# Patient Record
Sex: MALE | Race: WHITE | NOT HISPANIC OR LATINO | Employment: FULL TIME | URBAN - NONMETROPOLITAN AREA
[De-identification: names, ages, dates, MRNs, and addresses within clinical notes are randomized per-mention and may not be internally consistent; named-entity substitution may affect disease eponyms.]

---

## 2024-08-07 ENCOUNTER — OFFICE VISIT (OUTPATIENT)
Dept: CARDIOLOGY | Facility: CLINIC | Age: 59
End: 2024-08-07
Payer: COMMERCIAL

## 2024-08-07 VITALS
HEIGHT: 69 IN | SYSTOLIC BLOOD PRESSURE: 121 MMHG | DIASTOLIC BLOOD PRESSURE: 77 MMHG | WEIGHT: 226.6 LBS | BODY MASS INDEX: 33.56 KG/M2 | OXYGEN SATURATION: 98 % | HEART RATE: 59 BPM

## 2024-08-07 DIAGNOSIS — R94.31 ABNORMAL EKG: ICD-10-CM

## 2024-08-07 DIAGNOSIS — R06.09 DOE (DYSPNEA ON EXERTION): ICD-10-CM

## 2024-08-07 DIAGNOSIS — E78.5 DYSLIPIDEMIA: ICD-10-CM

## 2024-08-07 DIAGNOSIS — R53.83 OTHER FATIGUE: ICD-10-CM

## 2024-08-07 DIAGNOSIS — R07.2 PRECORDIAL CHEST PAIN: Primary | ICD-10-CM

## 2024-08-07 PROCEDURE — 99204 OFFICE O/P NEW MOD 45 MIN: CPT | Performed by: CLINICAL NURSE SPECIALIST

## 2024-10-18 ENCOUNTER — HOSPITAL ENCOUNTER (OUTPATIENT)
Dept: CARDIOLOGY | Facility: HOSPITAL | Age: 59
Discharge: HOME | End: 2024-10-18
Payer: COMMERCIAL

## 2024-10-18 DIAGNOSIS — R06.09 DOE (DYSPNEA ON EXERTION): ICD-10-CM

## 2024-10-18 DIAGNOSIS — R07.2 PRECORDIAL CHEST PAIN: ICD-10-CM

## 2024-10-18 DIAGNOSIS — R53.83 OTHER FATIGUE: ICD-10-CM

## 2024-10-18 PROCEDURE — 93017 CV STRESS TEST TRACING ONLY: CPT

## 2024-10-18 PROCEDURE — 93306 TTE W/DOPPLER COMPLETE: CPT

## 2024-10-19 LAB
BH CV STRESS RECOVERY BP: NORMAL MMHG
BH CV STRESS RECOVERY HR: 86 BPM
MAXIMAL PREDICTED HEART RATE: 163 BPM
PERCENT MAX PREDICTED HR: 104.91 %
STRESS BASELINE BP: NORMAL MMHG
STRESS BASELINE HR: 63 BPM
STRESS PERCENT HR: 123 %
STRESS POST ESTIMATED WORKLOAD: 11.8 METS
STRESS POST EXERCISE DUR MIN: 9 MIN
STRESS POST EXERCISE DUR SEC: 31 SEC
STRESS POST PEAK BP: NORMAL MMHG
STRESS POST PEAK HR: 171 BPM
STRESS TARGET HR: 139 BPM

## 2024-10-20 LAB
BH CV ECHO MEAS - ACS: 2.7 CM
BH CV ECHO MEAS - AO MAX PG: 5.2 MMHG
BH CV ECHO MEAS - AO MEAN PG: 2.9 MMHG
BH CV ECHO MEAS - AO ROOT DIAM: 3.6 CM
BH CV ECHO MEAS - AO V2 MAX: 114 CM/SEC
BH CV ECHO MEAS - AO V2 VTI: 25.7 CM
BH CV ECHO MEAS - EDV(CUBED): 107.2 ML
BH CV ECHO MEAS - EDV(MOD-SP4): 151 ML
BH CV ECHO MEAS - EF(MOD-SP4): 57.2 %
BH CV ECHO MEAS - ESV(CUBED): 17.8 ML
BH CV ECHO MEAS - ESV(MOD-SP4): 64.7 ML
BH CV ECHO MEAS - FS: 45.1 %
BH CV ECHO MEAS - IVS/LVPW: 0.92 CM
BH CV ECHO MEAS - IVSD: 1.22 CM
BH CV ECHO MEAS - LA DIMENSION: 3.6 CM
BH CV ECHO MEAS - LAT PEAK E' VEL: 11.1 CM/SEC
BH CV ECHO MEAS - LV DIASTOLIC VOL/BSA (35-75): 69.4 CM2
BH CV ECHO MEAS - LV MASS(C)D: 235.1 GRAMS
BH CV ECHO MEAS - LV SYSTOLIC VOL/BSA (12-30): 29.7 CM2
BH CV ECHO MEAS - LVIDD: 4.8 CM
BH CV ECHO MEAS - LVIDS: 2.6 CM
BH CV ECHO MEAS - LVPWD: 1.33 CM
BH CV ECHO MEAS - MED PEAK E' VEL: 8.7 CM/SEC
BH CV ECHO MEAS - MV A MAX VEL: 48.8 CM/SEC
BH CV ECHO MEAS - MV DEC TIME: 0.18 SEC
BH CV ECHO MEAS - MV E MAX VEL: 80.6 CM/SEC
BH CV ECHO MEAS - MV E/A: 1.65
BH CV ECHO MEAS - RAP SYSTOLE: 10 MMHG
BH CV ECHO MEAS - RVDD: 3.5 CM
BH CV ECHO MEAS - RVSP: 24.9 MMHG
BH CV ECHO MEAS - SV(MOD-SP4): 86.3 ML
BH CV ECHO MEAS - SVI(MOD-SP4): 39.7 ML/M2
BH CV ECHO MEAS - TR MAX PG: 14.9 MMHG
BH CV ECHO MEAS - TR MAX VEL: 192.7 CM/SEC
BH CV ECHO MEASUREMENTS AVERAGE E/E' RATIO: 8.14
LEFT ATRIUM VOLUME INDEX: 16.3 ML/M2

## 2024-10-23 ENCOUNTER — TELEPHONE (OUTPATIENT)
Dept: CARDIOLOGY | Facility: CLINIC | Age: 59
End: 2024-10-23
Payer: COMMERCIAL

## 2024-10-23 ENCOUNTER — OFFICE VISIT (OUTPATIENT)
Dept: CARDIOLOGY | Facility: CLINIC | Age: 59
End: 2024-10-23
Payer: COMMERCIAL

## 2024-10-23 VITALS
DIASTOLIC BLOOD PRESSURE: 71 MMHG | HEIGHT: 69 IN | SYSTOLIC BLOOD PRESSURE: 136 MMHG | BODY MASS INDEX: 33.18 KG/M2 | HEART RATE: 57 BPM | WEIGHT: 224 LBS | OXYGEN SATURATION: 98 %

## 2024-10-23 DIAGNOSIS — R06.09 DOE (DYSPNEA ON EXERTION): ICD-10-CM

## 2024-10-23 DIAGNOSIS — I47.10 PAROXYSMAL SVT (SUPRAVENTRICULAR TACHYCARDIA): Primary | ICD-10-CM

## 2024-10-23 DIAGNOSIS — R07.2 PRECORDIAL CHEST PAIN: ICD-10-CM

## 2024-10-23 PROCEDURE — 99214 OFFICE O/P EST MOD 30 MIN: CPT | Performed by: CLINICAL NURSE SPECIALIST

## 2024-10-23 NOTE — TELEPHONE ENCOUNTER
----- Message from Sarah DIGGS sent at 10/22/2024 10:51 AM EDT -----    ----- Message -----  From: Anali Gabriel APRN  Sent: 10/22/2024  10:29 AM EDT  To: Sarah Johnson    EF 60 to 65%.  Keep follow-up.

## 2024-10-23 NOTE — TELEPHONE ENCOUNTER
RELAY    Calling to inform you of no acute findings or abnormalities found on your echo. Please keep your next scheduled appointment or give us a call with any new or worsening symptoms. Thank you

## 2024-10-23 NOTE — PROGRESS NOTES
Subjective     Roman Diamond is a 59 y.o. male who presents today for Follow-up (Testing ).    CHIEF COMPLIANT  Chief Complaint   Patient presents with    Follow-up     Testing        Active Problems:  1.  Dyslipidemia  2.  Fatigue  3.  Chest tightness  3.1 Treadmill stress 10/18/24: No EKG evidence of ischemia. PVCs and a short run of SVT occurred during exercise. Short run of atrial fibrillation cannot be excluded. No sustained ectopy or block.   4.  Dyspnea  4.1 Echo 10/18/24: ejection fraction is 60 to 65%. Normal LV diastolic function and filling pressures.     HPI  The patient is a 59-year-old male that returns for follow-up to discuss recent testing.  The patient had a treadmill stress test on 10/18/2024 that showed no EKG evidence of ischemia.  He did have PVCs and a short run of SVT which occurred during exercise.  The short run of atrial fibrillation cannot be excluded.  There was no sustained ectopy or block.  He had an echocardiogram on 10/18/2024 that showed a preserved EF and normal LV diastolic function and filling pressures.  Since his last visit he denies further chest tightness or dyspnea.  He denies significant palpitations but will occasionally a flutter in his chest.    PRIOR MEDS  Current Outpatient Medications on File Prior to Visit   Medication Sig Dispense Refill    Coenzyme Q10 (COQ10 PO) Take  by mouth. Takes occasionally       No current facility-administered medications on file prior to visit.       ALLERGIES  Patient has no known allergies.    HISTORY  Past Medical History:   Diagnosis Date    Seasonal allergies        Social History     Socioeconomic History    Marital status:    Tobacco Use    Smoking status: Never    Smokeless tobacco: Never   Vaping Use    Vaping status: Never Used   Substance and Sexual Activity    Alcohol use: Yes    Drug use: Never    Sexual activity: Defer       Family History   Problem Relation Age of Onset    Kidney disease Mother     Lung cancer Father  "    Heart attack Paternal Uncle     Heart disease Paternal Grandfather        Review of Systems   Constitutional:  Negative for fatigue.   HENT:  Negative for congestion, rhinorrhea and sore throat.    Respiratory:  Negative for chest tightness and shortness of breath.    Cardiovascular:  Negative for chest pain, palpitations and leg swelling.   Gastrointestinal: Negative.    Genitourinary: Negative.    Neurological:  Positive for dizziness (Minere's disease). Negative for syncope, weakness, numbness and headaches.   Hematological:  Does not bruise/bleed easily.   Psychiatric/Behavioral:  Negative for sleep disturbance.        Objective     VITALS: /71   Pulse 57   Ht 175.3 cm (69\")   Wt 102 kg (224 lb)   SpO2 98%   BMI 33.08 kg/m²     LABS:   Lab Results (most recent)       None            IMAGING:   No Images in the past 120 days found..    EXAM:  Physical Exam  Constitutional:       Appearance: Normal appearance.   Eyes:      Pupils: Pupils are equal, round, and reactive to light.   Cardiovascular:      Rate and Rhythm: Normal rate and regular rhythm.      Pulses:           Carotid pulses are 2+ on the right side and 2+ on the left side.       Radial pulses are 2+ on the right side and 2+ on the left side.        Dorsalis pedis pulses are 2+ on the right side and 2+ on the left side.        Posterior tibial pulses are 2+ on the right side and 2+ on the left side.      Heart sounds: Normal heart sounds.   Pulmonary:      Effort: Pulmonary effort is normal.      Breath sounds: Normal breath sounds.   Abdominal:      General: Bowel sounds are normal.      Palpations: Abdomen is soft.   Musculoskeletal:      Right lower leg: No edema.      Left lower leg: No edema.   Skin:     General: Skin is warm and dry.      Capillary Refill: Capillary refill takes less than 2 seconds.   Neurological:      General: No focal deficit present.      Mental Status: He is alert and oriented to person, place, and time. "   Psychiatric:         Mood and Affect: Mood normal.         Thought Content: Thought content normal.   Procedure   Procedures       Assessment & Plan    Diagnosis Plan   1. Paroxysmal SVT (supraventricular tachycardia)  Holter Monitor - 72 Hour Up To 15 Days      2. Precordial chest pain        3. BARAJAS (dyspnea on exertion)          Plan:  1.  SVT: There was a brief episode of SVT during exercise on treadmill stress.  A short run of atrial fibrillation cannot be excluded.  The patient denies significant palpitation symptoms but will occasionally feel a flutter.  Will place a 2-week Holter monitor to further evaluate.  If diagnosed with paroxysmal A-fib his [id number] score would be a 0 at this time.  2.  Precordial chest pain: Treadmill stress test that showed no EKG evidence of ischemia.  He has had no further chest pain symptoms.  The patient was advised to notify our office if symptoms return.  3.  Dyspnea on exertion: Treadmill stress test showed no EKG evidence of ischemia and echocardiogram showed normal LV systolic and diastolic function.  The patient denies further dyspnea.  He was vies notify our office if symptoms return.    Return in about 6 weeks (around 12/4/2024).    Roman Diamond  reports that he has never smoked. He has never used smokeless tobacco.       BMI is >= 30 and <35. (Class 1 Obesity). The following options were offered after discussion;: referral to primary care           MEDS ORDERED DURING VISIT:  No orders of the defined types were placed in this encounter.      DISCONTINUED MEDS DURING VISIT:   There are no discontinued medications.       This document has been electronically signed by MONISHA Garrett  October 23, 2024 09:15 EDT    Dictated Utilizing Dragon Dictation: Part of this note may be an electronic transcription/translation of spoken language to printed text using the Dragon Dictation System

## 2024-12-11 ENCOUNTER — OFFICE VISIT (OUTPATIENT)
Dept: CARDIOLOGY | Facility: CLINIC | Age: 59
End: 2024-12-11
Payer: COMMERCIAL

## 2024-12-11 VITALS
DIASTOLIC BLOOD PRESSURE: 79 MMHG | SYSTOLIC BLOOD PRESSURE: 123 MMHG | HEIGHT: 69 IN | BODY MASS INDEX: 33.47 KG/M2 | HEART RATE: 65 BPM | WEIGHT: 226 LBS | OXYGEN SATURATION: 95 %

## 2024-12-11 DIAGNOSIS — R53.83 OTHER FATIGUE: ICD-10-CM

## 2024-12-11 DIAGNOSIS — I47.10 PAROXYSMAL SVT (SUPRAVENTRICULAR TACHYCARDIA): Primary | ICD-10-CM

## 2024-12-11 DIAGNOSIS — G47.10 HYPERSOMNOLENCE: ICD-10-CM

## 2024-12-11 DIAGNOSIS — R06.83 SNORING: ICD-10-CM

## 2024-12-11 PROCEDURE — 99214 OFFICE O/P EST MOD 30 MIN: CPT | Performed by: CLINICAL NURSE SPECIALIST

## 2024-12-11 NOTE — PROGRESS NOTES
Subjective     Roman Diamond is a 59 y.o. male who presents today for Follow-up (6wk).    CHIEF COMPLIANT  Chief Complaint   Patient presents with    Follow-up     6wk       Active Problems:  1.  Dyslipidemia  2.  Fatigue  3.  Chest tightness  3.1 Treadmill stress 10/18/24: No EKG evidence of ischemia. PVCs and a short run of SVT occurred during exercise. Short run of atrial fibrillation cannot be excluded. No sustained ectopy or block.   4.  Dyspnea  4.1 Echo 10/18/24: ejection fraction is 60 to 65%. Normal LV diastolic function and filling pressures.   5. Holter monitor 10/2024: Predominant underlying rhythm was normal sinus rhythm. 12 episodes of SVT with the fastest episode at 181 bpm and the longest episode lasting 16 beats. There were no patient triggered events associated with SVT. Less than 1% PAC, no patient reported symptoms. Less than 1% PVC, no patient reported symptoms. Episode of sinus bradycardia with heart rate 45 bpm which occurred at 9:36 AM. There were 2 patient triggered events associated with sinus bradycardia but on review this appears to be artifact.     HPI  The patient is a 59-year-old male that returns for follow-up to discuss recent Holter monitor.  The patient wore Holter monitor which did show predominant underlying rhythm of normal sinus rhythm.  There was 12 episodes of SVT with fastest episode at 181 and the longest episode lasting 16 beats.  There were no patient triggered events associated with SVT.  In review the majority of the SVT episodes did occur during hours of sleep.  The patient denies chest pain, dyspnea, syncope, near syncope.  He denies palpitation symptoms.  The patient does have increasing fatigue.  The patient states he has been waking up at night gasping.  He is concerned that he has sleep apnea.  The patient states that he does snore.    PRIOR MEDS  Current Outpatient Medications on File Prior to Visit   Medication Sig Dispense Refill    Coenzyme Q10 (COQ10 PO) Take  " by mouth. Takes occasionally       No current facility-administered medications on file prior to visit.       ALLERGIES  Patient has no known allergies.    HISTORY  Past Medical History:   Diagnosis Date    Seasonal allergies        Social History     Socioeconomic History    Marital status:    Tobacco Use    Smoking status: Never    Smokeless tobacco: Never   Vaping Use    Vaping status: Never Used   Substance and Sexual Activity    Alcohol use: Yes    Drug use: Never    Sexual activity: Defer       Family History   Problem Relation Age of Onset    Kidney disease Mother     Lung cancer Father     Heart attack Paternal Uncle     Heart disease Paternal Grandfather        Review of Systems   Constitutional:  Negative for fatigue.   Eyes:  Positive for visual disturbance (Glasses daily).   Respiratory:  Negative for chest tightness and shortness of breath.    Cardiovascular:  Negative for chest pain, palpitations and leg swelling.   Neurological:  Negative for dizziness, syncope, weakness, numbness and headaches.   Hematological:  Does not bruise/bleed easily.   Psychiatric/Behavioral:  Negative for sleep disturbance.        Objective     VITALS: /79   Pulse 65   Ht 175.3 cm (69\")   Wt 103 kg (226 lb)   SpO2 95%   BMI 33.37 kg/m²     LABS:   Lab Results (most recent)       None            IMAGING:   No Images in the past 120 days found..    EXAM:  Constitutional:       Appearance: Normal appearance.   Eyes:      Pupils: Pupils are equal, round, and reactive to light.   Cardiovascular:      Rate and Rhythm: Normal rate and regular rhythm.      Pulses:           Carotid pulses are 2+ on the right side and 2+ on the left side.       Radial pulses are 2+ on the right side and 2+ on the left side.        Dorsalis pedis pulses are 2+ on the right side and 2+ on the left side.        Posterior tibial pulses are 2+ on the right side and 2+ on the left side.      Heart sounds: Normal heart sounds. "   Pulmonary:      Effort: Pulmonary effort is normal.      Breath sounds: Normal breath sounds.   Abdominal:      General: Bowel sounds are normal.      Palpations: Abdomen is soft.   Musculoskeletal:      Right lower leg: No edema.      Left lower leg: No edema.   Skin:     General: Skin is warm and dry.      Capillary Refill: Capillary refill takes less than 2 seconds.   Neurological:      General: No focal deficit present.      Mental Status: He is alert and oriented to person, place, and time.   Psychiatric:         Mood and Affect: Mood normal.         Thought Content: Thought content normal.   Copied information reviewed no change in assessment.     Procedure   Procedures       Assessment & Plan    Diagnosis Plan   1. Paroxysmal SVT (supraventricular tachycardia)        2. Other fatigue  Home Sleep Study      3. Snoring  Home Sleep Study      4. Hypersomnolence  Home Sleep Study        Plan:  SVT: Were additional episodes of SVT on Holter monitor.  The patient did not have symptoms with these.  At this time we will hold on starting medication as his resting heart rate stays between 58 and 60.  The majority of his SVT episodes did occur during hours of sleep.  He does have symptoms of sleep apnea including fatigue, snoring and hypersomnolence.  Will order a home sleep study to evaluate for possible sleep apnea.  Will call him these results.  The patient states if his sleep study is positive he would like to see Dr. Nichole for pulmonology.      Return in about 6 months (around 6/11/2025).    Roman Diamond  reports that he has never smoked. He has never used smokeless tobacco.                 MEDS ORDERED DURING VISIT:  No orders of the defined types were placed in this encounter.      DISCONTINUED MEDS DURING VISIT:   There are no discontinued medications.       This document has been electronically signed by MONISHA Garrett  December 11, 2024 10:13 EST    Dictated Utilizing Dragon Dictation: Part of this  note may be an electronic transcription/translation of spoken language to printed text using the Dragon Dictation System

## 2025-01-29 ENCOUNTER — TELEPHONE (OUTPATIENT)
Dept: CARDIOLOGY | Facility: CLINIC | Age: 60
End: 2025-01-29
Payer: COMMERCIAL

## 2025-01-29 DIAGNOSIS — G47.10 HYPERSOMNOLENCE: ICD-10-CM

## 2025-01-29 DIAGNOSIS — G47.33 OSA (OBSTRUCTIVE SLEEP APNEA): ICD-10-CM

## 2025-01-29 DIAGNOSIS — R06.83 SNORING: Primary | ICD-10-CM

## 2025-01-29 NOTE — TELEPHONE ENCOUNTER
----- Message from Anali Gabriel sent at 1/29/2025  8:19 AM EST -----  Findings consistent with mild sleep apnea.  He will need pulm referral.  If I remember correctly we discussed Dr Nichole but please confirm the patient's preference and then send referral.  Thanks

## 2025-06-11 ENCOUNTER — OFFICE VISIT (OUTPATIENT)
Dept: CARDIOLOGY | Facility: CLINIC | Age: 60
End: 2025-06-11
Payer: COMMERCIAL

## 2025-06-11 VITALS
DIASTOLIC BLOOD PRESSURE: 76 MMHG | HEIGHT: 69 IN | BODY MASS INDEX: 33.24 KG/M2 | WEIGHT: 224.4 LBS | OXYGEN SATURATION: 99 % | HEART RATE: 64 BPM | SYSTOLIC BLOOD PRESSURE: 119 MMHG

## 2025-06-11 DIAGNOSIS — G47.33 OSA (OBSTRUCTIVE SLEEP APNEA): ICD-10-CM

## 2025-06-11 DIAGNOSIS — I47.10 PAROXYSMAL SVT (SUPRAVENTRICULAR TACHYCARDIA): Primary | ICD-10-CM

## 2025-06-11 PROCEDURE — 99213 OFFICE O/P EST LOW 20 MIN: CPT | Performed by: CLINICAL NURSE SPECIALIST

## 2025-06-11 NOTE — PROGRESS NOTES
Subjective     Roman Diamond is a 59 y.o. male who presents today for Follow-up (6mth).    CHIEF COMPLIANT  Chief Complaint   Patient presents with    Follow-up     6mth       Active Problems:  1.  Dyslipidemia  2.  Fatigue  3.  Treadmill stress 10/18/24: No EKG evidence of ischemia. PVCs and a short run of SVT occurred during exercise. Short run of atrial fibrillation cannot be excluded. No sustained ectopy or block.   4.  Echo 10/18/24: ejection fraction is 60 to 65%. Normal LV diastolic function and filling pressures.   5. Holter monitor 10/2024: Predominant underlying rhythm was normal sinus rhythm. 12 episodes of SVT with the fastest episode at 181 bpm and the longest episode lasting 16 beats. There were no patient triggered events associated with SVT. Less than 1% PAC, no patient reported symptoms. Less than 1% PVC, no patient reported symptoms. Episode of sinus bradycardia with heart rate 45 bpm which occurred at 9:36 AM. There were 2 patient triggered events associated with sinus bradycardia but on review this appears to be artifact.   6.  Obstructive sleep apnea, compliant with CPAP       HPI  The patient is a 59-year-old male that returns for follow-up.  Overall he is doing well.  He denies chest pain, dyspnea, syncope, near syncope or palpitations.  He does follow with pulmonology now and is wearing a CPAP.  Heart rate and blood pressure have been stable.    PRIOR MEDS  Current Outpatient Medications on File Prior to Visit   Medication Sig Dispense Refill    Coenzyme Q10 (COQ10 PO) Take  by mouth. Takes occasionally       No current facility-administered medications on file prior to visit.       ALLERGIES  Patient has no known allergies.    HISTORY  Past Medical History:   Diagnosis Date    Seasonal allergies        Social History     Socioeconomic History    Marital status:    Tobacco Use    Smoking status: Never    Smokeless tobacco: Never   Vaping Use    Vaping status: Never Used   Substance and  "Sexual Activity    Alcohol use: Yes    Drug use: Never    Sexual activity: Defer       Family History   Problem Relation Age of Onset    Kidney disease Mother     Lung cancer Father     Heart attack Paternal Uncle     Heart disease Paternal Grandfather        Review of Systems   Constitutional:  Negative for fatigue.   Respiratory:  Negative for chest tightness and shortness of breath.    Cardiovascular:  Negative for chest pain, palpitations and leg swelling.   Neurological:  Negative for dizziness, weakness, numbness and headaches.   Hematological:  Does not bruise/bleed easily.   Psychiatric/Behavioral:  Negative for sleep disturbance.        Objective     VITALS: /76   Pulse 64   Ht 175.3 cm (69\")   Wt 102 kg (224 lb 6.4 oz)   SpO2 99%   BMI 33.14 kg/m²     LABS:   Lab Results (most recent)       None            IMAGING:   No Images in the past 120 days found..    EXAM:  Physical Exam  Constitutional:       Appearance: Normal appearance.   Eyes:      Pupils: Pupils are equal, round, and reactive to light.   Cardiovascular:      Rate and Rhythm: Normal rate and regular rhythm.      Pulses:           Carotid pulses are 2+ on the right side and 2+ on the left side.       Radial pulses are 2+ on the right side and 2+ on the left side.        Dorsalis pedis pulses are 2+ on the right side and 2+ on the left side.        Posterior tibial pulses are 2+ on the right side and 2+ on the left side.      Heart sounds: Normal heart sounds.   Pulmonary:      Effort: Pulmonary effort is normal.      Breath sounds: Normal breath sounds.   Abdominal:      General: Bowel sounds are normal.      Palpations: Abdomen is soft.   Musculoskeletal:      Right lower leg: No edema.      Left lower leg: No edema.   Skin:     General: Skin is warm and dry.      Capillary Refill: Capillary refill takes less than 2 seconds.   Neurological:      General: No focal deficit present.      Mental Status: He is alert and oriented to " person, place, and time.   Psychiatric:         Mood and Affect: Mood normal.         Thought Content: Thought content normal.         Procedure   Procedures       Assessment & Plan    Diagnosis Plan   1. Paroxysmal SVT (supraventricular tachycardia)        2. FRANDY (obstructive sleep apnea)          Plan:  1.  Paroxysmal SVT: Symptoms currently stable.  He denies palpitations.  Overall he states he is feeling well.  He denies chest pain or dyspnea.  He was advised to notify our office if symptoms develop.  2.  Obstructive sleep apnea: The patient has seen pulmonology and is now using a CPAP.  He is compliant with this.    Return in about 1 year (around 6/11/2026).    Roman Diamond  reports that he has never smoked. He has never used smokeless tobacco.        MEDS ORDERED DURING VISIT:  No orders of the defined types were placed in this encounter.      DISCONTINUED MEDS DURING VISIT:   There are no discontinued medications.       This document has been electronically signed by MONISHA Garrett  June 11, 2025 10:10 EDT    Dictated Utilizing Dragon Dictation: Part of this note may be an electronic transcription/translation of spoken language to printed text using the Dragon Dictation System